# Patient Record
Sex: MALE | Race: WHITE | Employment: FULL TIME | URBAN - METROPOLITAN AREA
[De-identification: names, ages, dates, MRNs, and addresses within clinical notes are randomized per-mention and may not be internally consistent; named-entity substitution may affect disease eponyms.]

---

## 2020-03-24 ENCOUNTER — HOSPITAL ENCOUNTER (OUTPATIENT)
Age: 35
Discharge: HOME | End: 2020-03-24
Payer: COMMERCIAL

## 2020-03-24 VITALS
WEIGHT: 190 LBS | SYSTOLIC BLOOD PRESSURE: 131 MMHG | BODY MASS INDEX: 28.14 KG/M2 | OXYGEN SATURATION: 97 % | HEIGHT: 69 IN | RESPIRATION RATE: 18 BRPM | HEART RATE: 84 BPM | TEMPERATURE: 97 F | DIASTOLIC BLOOD PRESSURE: 75 MMHG

## 2020-03-24 DIAGNOSIS — L03.113 CELLULITIS OF RIGHT FOREARM: Primary | ICD-10-CM

## 2020-03-24 PROCEDURE — 99203 OFFICE O/P NEW LOW 30 MIN: CPT

## 2020-03-24 PROCEDURE — 99204 OFFICE O/P NEW MOD 45 MIN: CPT

## 2020-03-24 RX ORDER — AMOXICILLIN 250 MG/5ML
POWDER, FOR SUSPENSION ORAL 3 TIMES DAILY
COMMUNITY
End: 2020-03-24

## 2020-03-24 RX ORDER — CHLORHEXIDINE GLUCONATE 4 G/100ML
30 SOLUTION TOPICAL
Qty: 1 BOTTLE | Refills: 0 | Status: SHIPPED | OUTPATIENT
Start: 2020-03-24 | End: 2020-03-27

## 2020-03-24 RX ORDER — CLINDAMYCIN HYDROCHLORIDE 300 MG/1
300 CAPSULE ORAL 3 TIMES DAILY
Qty: 30 CAPSULE | Refills: 0 | Status: SHIPPED | OUTPATIENT
Start: 2020-03-24 | End: 2020-04-03

## 2020-03-24 RX ORDER — SULFAMETHOXAZOLE AND TRIMETHOPRIM 800; 160 MG/1; MG/1
1 TABLET ORAL 2 TIMES DAILY
Qty: 20 TABLET | Refills: 0 | Status: SHIPPED | OUTPATIENT
Start: 2020-03-24 | End: 2020-04-03

## 2020-03-24 NOTE — ED PROVIDER NOTES
Patient Seen in: [facility]      History   Patient presents with:  Cellulitis    Stated Complaint: inflamed cyst on arm    HPI    This is a 23-year-old male who comes in today with complaint of redness and pain to his right fore reactive to light and accommodation. Tympanic membranes normal.  Nasopharynx is clear oropharynx is clear without erythema or exudate or obstruction. No retropharyngeal or peritonsillar abscess noted. Neck:  Supple. No mass or adenopathy noted.   No men am going to give the patient a prescription for Bactroban as well and Hibiclens. Patient was instructed to return if the marked reddened area extends past the marker lines, he develops a fever, or has streaking up or down his arm.   I did mention to the pa needed.   Qty: 1 Bottle Refills: 0

## 2020-03-24 NOTE — ED INITIAL ASSESSMENT (HPI)
Pt c/o pain, redness and warmth to area surrounding a scabbed wound right forearm. Pt states what started out as a ingrown hair has progressed.

## 2025-03-05 ENCOUNTER — HOSPITAL ENCOUNTER (OUTPATIENT)
Age: 40
Discharge: HOME | End: 2025-03-05
Attending: EMERGENCY MEDICINE

## 2025-03-05 VITALS
SYSTOLIC BLOOD PRESSURE: 138 MMHG | BODY MASS INDEX: 26.66 KG/M2 | OXYGEN SATURATION: 98 % | HEART RATE: 119 BPM | HEIGHT: 69 IN | TEMPERATURE: 98 F | DIASTOLIC BLOOD PRESSURE: 82 MMHG | WEIGHT: 180 LBS | RESPIRATION RATE: 16 BRPM

## 2025-03-05 DIAGNOSIS — L03.115 BILATERAL LOWER LEG CELLULITIS: Primary | ICD-10-CM

## 2025-03-05 DIAGNOSIS — L03.116 BILATERAL LOWER LEG CELLULITIS: Primary | ICD-10-CM

## 2025-03-05 LAB
#MXD IC: 0.6 X10ˆ3/UL (ref 0.1–1)
BUN BLD-MCNC: 25 MG/DL (ref 7–18)
CHLORIDE BLD-SCNC: 103 MMOL/L (ref 98–112)
CO2 BLD-SCNC: 27 MMOL/L (ref 21–32)
CREAT BLD-MCNC: 1 MG/DL
EGFRCR SERPLBLD CKD-EPI 2021: 99 ML/MIN/1.73M2 (ref 60–?)
GLUCOSE BLD-MCNC: 128 MG/DL (ref 70–99)
HCT VFR BLD AUTO: 40.5 %
HCT VFR BLD CALC: 41 %
HGB BLD-MCNC: 13.5 G/DL
ISTAT IONIZED CALCIUM FOR CHEM 8: 1.17 MMOL/L (ref 1.12–1.32)
LYMPHOCYTES # BLD AUTO: 1.9 X10ˆ3/UL (ref 1–4)
LYMPHOCYTES NFR BLD AUTO: 26.9 %
MCH RBC QN AUTO: 29.1 PG (ref 26–34)
MCHC RBC AUTO-ENTMCNC: 33.3 G/DL (ref 31–37)
MCV RBC AUTO: 87.3 FL (ref 80–100)
MIXED CELL %: 9.4 %
NEUTROPHILS # BLD AUTO: 4.4 X10ˆ3/UL (ref 1.5–7.7)
NEUTROPHILS NFR BLD AUTO: 63.7 %
PLATELET # BLD AUTO: 201 X10ˆ3/UL (ref 150–450)
POTASSIUM BLD-SCNC: 4.2 MMOL/L (ref 3.6–5.1)
RBC # BLD AUTO: 4.64 X10ˆ6/UL
SODIUM BLD-SCNC: 140 MMOL/L (ref 136–145)
WBC # BLD AUTO: 6.9 X10ˆ3/UL (ref 4–11)

## 2025-03-05 PROCEDURE — 80047 BASIC METABLC PNL IONIZED CA: CPT

## 2025-03-05 PROCEDURE — 99204 OFFICE O/P NEW MOD 45 MIN: CPT

## 2025-03-05 PROCEDURE — 96374 THER/PROPH/DIAG INJ IV PUSH: CPT

## 2025-03-05 PROCEDURE — 85025 COMPLETE CBC W/AUTO DIFF WBC: CPT | Performed by: EMERGENCY MEDICINE

## 2025-03-05 RX ORDER — CEPHALEXIN 500 MG/1
500 CAPSULE ORAL 4 TIMES DAILY
Qty: 40 CAPSULE | Refills: 0 | Status: SHIPPED | OUTPATIENT
Start: 2025-03-05 | End: 2025-03-15

## 2025-03-05 NOTE — DISCHARGE INSTRUCTIONS
Contact your family doctor today to arrange reexamination and skin check in a few days    Elevate your legs as often as possible  Keep the skin clean and dry  Keflex antibiotic starts this evening    If your symptoms worsen or any new concerning symptoms develop, go to the hospital emergency department

## 2025-03-05 NOTE — ED PROVIDER NOTES
Patient Seen in: [facility]      History     Chief Complaint   Patient presents with    Rash Skin Problem     Stated Complaint: feet/ankle swelling    Subjective:   HPI      Patient tells me that about 2 weeks ago, he had some redness and swelling of the lower legs.  It seemed to get better but then took a turn for the worse last 2 days.  Patient felt achy without fever.  He did feel little chilled.  No redness elsewhere.  He denies any unusual activities or injuries.  No abrasions.  No punctures to the skin.    Objective:     History reviewed. No pertinent past medical history.           History reviewed. No pertinent surgical history.             Social History     Socioeconomic History    Marital status: Single   Tobacco Use    Smoking status: Every Day    Smokeless tobacco: Never   Vaping Use    Vaping status: Former    Start date: 6/24/2019    Quit date: 9/24/2019   Substance and Sexual Activity    Alcohol use: Yes    Drug use: Not Currently              Review of Systems    Positive for stated complaint: feet/ankle swelling  Other systems are as noted in HPI.  Constitutional and vital signs reviewed.      All other systems reviewed and negative except as noted above.    Physical Exam     ED Triage Vitals [03/05/25 1458]   /82   Pulse 119   Resp 16   Temp 98.1 °F (36.7 °C)   Temp src Oral   SpO2 98 %   O2 Device None (Room air)       Current Vitals:   Vital Signs  BP: 138/82  Pulse: 119  Resp: 16  Temp: 98.1 °F (36.7 °C)  Temp src: Oral    Oxygen Therapy  SpO2: 98 %  O2 Device: None (Room air)        Physical Exam  Legs: Both lower extremities are remarkable for scattered dime size hyperpigmented areas.  There is an area of fairly sharply demarcated blanching bright erythema noted extending about 5 finger breaths along the lower third of the right lower leg not involving the ankle joint.  No proximal lymphangitis.  The area is swollen, tender, and warm to touch.  On the left leg, there  is fainter erythema of about 2-3 finger breaths diameter again not involving the ankle joint.  More proximal calf is nonswollen nontender.  Dorsalis pedis pulse intact.    ED Course     Labs Reviewed   POCT ISTAT CHEM8 CARTRIDGE - Abnormal; Notable for the following components:       Result Value    ISTAT BUN 25 (*)     ISTAT Glucose 128 (*)     All other components within normal limits   POCT CBC                   MDM     Patient has cellulitis of his lower extremities.  Patient denies any injections/punctures to the lower extremities.  No abrasions or other injuries.  No evidence for DVT    I-STAT shows normal electrolytes and creatinine.  Glucose 128  Hemogram shows normal white count, hemoglobin, and platelets    I discussed with patient that I will treat with antibiotic but would have a low threshold to seek reevaluation in the hospital emergency department if symptoms are worsening or if any new symptoms develop.      I recommend:  Contact your family doctor today to arrange reexamination and skin check in a few days    Elevate your legs as often as possible  Keep the skin clean and dry  Keflex antibiotic    If your symptoms worsen or any new concerning symptoms develop, go to the hospital emergency department      Medical Decision Making      Disposition and Plan     Clinical Impression:  1. Bilateral lower leg cellulitis         Disposition:  Discharge  3/5/2025  4:18 pm    Follow-up:  Lizeth Rodriguez MD  [address] [address] [place] [id number]  [phone]    Call   As needed          Medications Prescribed:  Current Discharge Medication List        START taking these medications    Details   cephALEXin 500 MG Oral Cap Take 1 capsule (500 mg total) by mouth 4 (four) times daily for 10 days.  Qty: 40 capsule, Refills: 0                 Supplementary Documentation:

## 2025-03-10 ENCOUNTER — HOSPITAL ENCOUNTER (EMERGENCY)
Facility: HOSPITAL | Age: 40
Discharge: LEFT BEFORE DISCHARGE | End: 2025-03-10
Attending: EMERGENCY MEDICINE
Payer: COMMERCIAL

## 2025-03-10 ENCOUNTER — APPOINTMENT (OUTPATIENT)
Dept: ULTRASOUND IMAGING | Facility: HOSPITAL | Age: 40
End: 2025-03-10
Attending: EMERGENCY MEDICINE
Payer: COMMERCIAL

## 2025-03-10 ENCOUNTER — HOSPITAL ENCOUNTER (OUTPATIENT)
Age: 40
Discharge: ANOTHER HOSPITAL | End: 2025-03-10
Attending: EMERGENCY MEDICINE
Payer: COMMERCIAL

## 2025-03-10 ENCOUNTER — APPOINTMENT (OUTPATIENT)
Dept: CT IMAGING | Facility: HOSPITAL | Age: 40
End: 2025-03-10
Attending: EMERGENCY MEDICINE
Payer: COMMERCIAL

## 2025-03-10 VITALS
TEMPERATURE: 98 F | HEART RATE: 114 BPM | DIASTOLIC BLOOD PRESSURE: 82 MMHG | OXYGEN SATURATION: 99 % | RESPIRATION RATE: 16 BRPM | WEIGHT: 180 LBS | SYSTOLIC BLOOD PRESSURE: 164 MMHG | BODY MASS INDEX: 26.66 KG/M2 | HEIGHT: 69 IN

## 2025-03-10 VITALS
OXYGEN SATURATION: 99 % | DIASTOLIC BLOOD PRESSURE: 72 MMHG | WEIGHT: 180 LBS | HEIGHT: 69 IN | RESPIRATION RATE: 18 BRPM | TEMPERATURE: 97 F | HEART RATE: 115 BPM | BODY MASS INDEX: 26.66 KG/M2 | SYSTOLIC BLOOD PRESSURE: 148 MMHG

## 2025-03-10 DIAGNOSIS — L03.119 CELLULITIS OF LOWER EXTREMITY, UNSPECIFIED LATERALITY: Primary | ICD-10-CM

## 2025-03-10 DIAGNOSIS — L03.119 CELLULITIS OF LOWER EXTREMITY, UNSPECIFIED LATERALITY: ICD-10-CM

## 2025-03-10 DIAGNOSIS — R60.0 LOWER EXTREMITY EDEMA: Primary | ICD-10-CM

## 2025-03-10 DIAGNOSIS — R06.02 SHORTNESS OF BREATH: ICD-10-CM

## 2025-03-10 DIAGNOSIS — J98.01 ACUTE BRONCHOSPASM: ICD-10-CM

## 2025-03-10 LAB
ALBUMIN SERPL-MCNC: 4.5 G/DL (ref 3.2–4.8)
ALBUMIN/GLOB SERPL: 1.6 {RATIO} (ref 1–2)
ALP LIVER SERPL-CCNC: 147 U/L
ALT SERPL-CCNC: 28 U/L
ANION GAP SERPL CALC-SCNC: 3 MMOL/L (ref 0–18)
AST SERPL-CCNC: 21 U/L (ref ?–34)
BASOPHILS # BLD AUTO: 0.01 X10(3) UL (ref 0–0.2)
BASOPHILS NFR BLD AUTO: 0.2 %
BILIRUB SERPL-MCNC: 0.4 MG/DL (ref 0.3–1.2)
BUN BLD-MCNC: 17 MG/DL (ref 9–23)
CALCIUM BLD-MCNC: 9.5 MG/DL (ref 8.7–10.6)
CHLORIDE SERPL-SCNC: 104 MMOL/L (ref 98–112)
CO2 SERPL-SCNC: 33 MMOL/L (ref 21–32)
CREAT BLD-MCNC: 0.98 MG/DL
EGFRCR SERPLBLD CKD-EPI 2021: 101 ML/MIN/1.73M2 (ref 60–?)
EOSINOPHIL # BLD AUTO: 0.23 X10(3) UL (ref 0–0.7)
EOSINOPHIL NFR BLD AUTO: 4.3 %
ERYTHROCYTE [DISTWIDTH] IN BLOOD BY AUTOMATED COUNT: 12 %
GLOBULIN PLAS-MCNC: 2.9 G/DL (ref 2–3.5)
GLUCOSE BLD-MCNC: 103 MG/DL (ref 70–99)
HCT VFR BLD AUTO: 39.9 %
HGB BLD-MCNC: 13.5 G/DL
IMM GRANULOCYTES # BLD AUTO: 0.01 X10(3) UL (ref 0–1)
IMM GRANULOCYTES NFR BLD: 0.2 %
LACTATE SERPL-SCNC: 1.2 MMOL/L (ref 0.5–2)
LYMPHOCYTES # BLD AUTO: 1.49 X10(3) UL (ref 1–4)
LYMPHOCYTES NFR BLD AUTO: 27.5 %
MCH RBC QN AUTO: 29.1 PG (ref 26–34)
MCHC RBC AUTO-ENTMCNC: 33.8 G/DL (ref 31–37)
MCV RBC AUTO: 86 FL
MONOCYTES # BLD AUTO: 0.68 X10(3) UL (ref 0.1–1)
MONOCYTES NFR BLD AUTO: 12.6 %
NEUTROPHILS # BLD AUTO: 2.99 X10 (3) UL (ref 1.5–7.7)
NEUTROPHILS # BLD AUTO: 2.99 X10(3) UL (ref 1.5–7.7)
NEUTROPHILS NFR BLD AUTO: 55.2 %
NT-PROBNP SERPL-MCNC: 58 PG/ML (ref ?–125)
OSMOLALITY SERPL CALC.SUM OF ELEC: 292 MOSM/KG (ref 275–295)
PLATELET # BLD AUTO: 200 10(3)UL (ref 150–450)
POTASSIUM SERPL-SCNC: 4.2 MMOL/L (ref 3.5–5.1)
PROT SERPL-MCNC: 7.4 G/DL (ref 5.7–8.2)
RBC # BLD AUTO: 4.64 X10(6)UL
SODIUM SERPL-SCNC: 140 MMOL/L (ref 136–145)
TROPONIN I SERPL HS-MCNC: 4 NG/L
WBC # BLD AUTO: 5.4 X10(3) UL (ref 4–11)

## 2025-03-10 PROCEDURE — 87040 BLOOD CULTURE FOR BACTERIA: CPT | Performed by: EMERGENCY MEDICINE

## 2025-03-10 PROCEDURE — 93970 EXTREMITY STUDY: CPT | Performed by: EMERGENCY MEDICINE

## 2025-03-10 PROCEDURE — 93005 ELECTROCARDIOGRAM TRACING: CPT

## 2025-03-10 PROCEDURE — 80053 COMPREHEN METABOLIC PANEL: CPT | Performed by: EMERGENCY MEDICINE

## 2025-03-10 PROCEDURE — 99284 EMERGENCY DEPT VISIT MOD MDM: CPT

## 2025-03-10 PROCEDURE — 99212 OFFICE O/P EST SF 10 MIN: CPT

## 2025-03-10 PROCEDURE — 84484 ASSAY OF TROPONIN QUANT: CPT | Performed by: EMERGENCY MEDICINE

## 2025-03-10 PROCEDURE — 83605 ASSAY OF LACTIC ACID: CPT | Performed by: EMERGENCY MEDICINE

## 2025-03-10 PROCEDURE — 85025 COMPLETE CBC W/AUTO DIFF WBC: CPT | Performed by: EMERGENCY MEDICINE

## 2025-03-10 PROCEDURE — 99285 EMERGENCY DEPT VISIT HI MDM: CPT

## 2025-03-10 PROCEDURE — 93010 ELECTROCARDIOGRAM REPORT: CPT

## 2025-03-10 PROCEDURE — 83880 ASSAY OF NATRIURETIC PEPTIDE: CPT | Performed by: EMERGENCY MEDICINE

## 2025-03-10 PROCEDURE — 96365 THER/PROPH/DIAG IV INF INIT: CPT

## 2025-03-10 RX ORDER — SULFAMETHOXAZOLE AND TRIMETHOPRIM 800; 160 MG/1; MG/1
1 TABLET ORAL 2 TIMES DAILY
Qty: 20 TABLET | Refills: 0 | Status: SHIPPED | OUTPATIENT
Start: 2025-03-10 | End: 2025-03-20

## 2025-03-10 RX ORDER — ALBUTEROL SULFATE 90 UG/1
2 INHALANT RESPIRATORY (INHALATION) EVERY 4 HOURS PRN
Qty: 1 EACH | Refills: 0 | Status: SHIPPED | OUTPATIENT
Start: 2025-03-10 | End: 2025-04-09

## 2025-03-10 RX ORDER — ALBUTEROL SULFATE 90 UG/1
4 INHALANT RESPIRATORY (INHALATION) ONCE
Status: COMPLETED | OUTPATIENT
Start: 2025-03-10 | End: 2025-03-10

## 2025-03-10 NOTE — ED INITIAL ASSESSMENT (HPI)
Pt arrives to ED for evaluation of swelling to his bilateral lower extremities. Pt states the swelling has been there for about 3 weeks, pt denies being exposed to anything. Pt seen at [facility] last week, pt received IV meds and was discharged with home meds with no relief. Pt bilateral lower extremities are red, warm, with stiff ankle joints.    2

## 2025-03-10 NOTE — ED PROVIDER NOTES
Patient Seen in: [facility]      History   No chief complaint on file.    Stated Complaint: cellulitis on both legs    Subjective:   HPI      40 yo male no signal past medical he presents ED with complaints of lower extremity edema and erythema.  Reports that he was placed on Keflex on 3/5/2025 when he was seen at [facility] was diagnosed with lower extremity cellulitis.  Reports that the swelling has increased he is noticing more veins in his legs and the redness is now going up the leg above the knee.  Denies any fevers or chills.  Reports that he feels short of breath and occasionally lightheaded.  Denies any active chest pain here.    Objective:     No pertinent past medical history.            No pertinent past surgical history.              No pertinent social history.            Review of Systems    Positive for stated complaint: cellulitis on both legs  Other systems are as noted in HPI.  Constitutional and vital signs reviewed.      All other systems reviewed and negative except as noted above.    Physical Exam     ED Triage Vitals [03/10/25 1235]   /72   Pulse 115   Resp 18   Temp 97.4 °F (36.3 °C)   Temp src Oral   SpO2 99 %   O2 Device None (Room air)       Current Vitals:   Vital Signs  BP: 148/72  Pulse: 115  Resp: 18  Temp: 97.4 °F (36.3 °C)  Temp src: Oral    Oxygen Therapy  SpO2: 99 %  O2 Device: None (Room air)        Physical Exam  Vitals and nursing note reviewed.   Constitutional:       Appearance: He is well-developed.   HENT:      Head: Normocephalic and atraumatic.   Eyes:      Pupils: Pupils are equal, round, and reactive to light.   Cardiovascular:      Rate and Rhythm: Regular rhythm. Tachycardia present.   Pulmonary:      Effort: Pulmonary effort is normal.      Breath sounds: Normal breath sounds.   Abdominal:      General: Bowel sounds are normal.      Palpations: Abdomen is soft.   Musculoskeletal:         General: No deformity.      Cervical  back: Normal range of motion and neck supple.   Skin:     General: Skin is warm and dry.      Capillary Refill: Capillary refill takes less than 2 seconds.      Comments: Significant areas of erythema and warmth in bilateral lower extremities, +1-2 pitting edema bilateral lower extremities, + varicose veins   Neurological:      Mental Status: He is alert and oriented to person, place, and time.             ED Course   Labs Reviewed - No data to display                    MDM     This is a 39-year-old male who  presents to the immediate care with his wife with complaints of lower extremity swelling worsening redness while on antibiotics and shortness of breath and dizziness.  Patient is mildly hypertensive on arrival and tachycardic.  Oxygen saturation are 99% on room air lungs are CTA patient appears to be in no respiratory distress.  Lower extremity with significant edema and erythema bilateral lower extremities.  Concern for antibiotic failure versus bilateral DVT.  Given new symptoms of shortness of breath as well as this tachycardia will send to ED for further evaluation for venous thrombosis and worsening cellulitis.  Patient will go directly to [facility] ER.        Medical Decision Making      Disposition and Plan     Clinical Impression:  1. Lower extremity edema    2. Shortness of breath    3. Cellulitis of lower extremity, unspecified laterality         Disposition:  Ic to ed  3/10/2025  1:17 pm    Follow-up:  No follow-up provider specified.        Medications Prescribed:  Current Discharge Medication List              Supplementary Documentation:

## 2025-03-10 NOTE — ED PROVIDER NOTES
Patient Seen in: [facility] Emergency Department      History     Chief Complaint   Patient presents with    Swelling Edema    Difficulty Breathing    Chest Pain Angina    Cellulitis     Stated Complaint: Bilat lower leg swelling x 3 weeks    Subjective:   HPI      This is a 39-year-old male who arrives with bilateral lower extremity swelling for 3 weeks.  About 3 weeks ago the patient started swelling of the legs.  The patient states that on 3/5/2025 the patient was seen for this rash possible cellulitis darted on antibiotics.  He is at about 4 to 5 days of antibiotics.  He says he was given a first dose and IV dose of antibiotics and then sent home.  At that time he really felt bad he had chills did not feel well.  He stated that he was more in pain.  Those symptoms are pretty much gotten resolved but he has noticed that the swelling has not gotten any better.  He is bilateral lower extremities are red warm with stiff general ankle joints.  The patient states that he also had some mild shortness of breath.  The patient was seen today in March 10 and was sent here for further evaluation.  The patient denies any fevers or chills at this present time he does have some intermittent wheezing.  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  He does not doing that it recently and has not had any recent injections..  The patient denied any fevers or chills recently.  Denies any history of blood clots.  He does smoke.    Objective:     History reviewed. No pertinent past medical history.           History reviewed. No pertinent surgical history.             Social History     Socioeconomic History    Marital status: Single   Tobacco Use    Smoking status: Every Day    Smokeless tobacco: Never   Vaping Use    Vaping status: Former    Start date: 6/24/2019    Quit date: 9/24/2019   Substance and Sexual Activity    Alcohol use: Yes     Comment: daily    Drug use: Not  Currently                  Physical Exam     ED Triage Vitals [03/10/25 1545]   BP (!) 164/82   Pulse 114   Resp 16   Temp 97.9 °F (36.6 °C)   Temp src Temporal   SpO2 99 %   O2 Device        Current Vitals:   Vital Signs  BP: (!) 164/82  Pulse: 114  Resp: 16  Temp: 97.9 °F (36.6 °C)  Temp src: Temporal    Oxygen Therapy  SpO2: 99 %        Physical Exam     General: .  Patient is in no respiratory distress.  At this present time.  Denies any chest pain.  The patient is in no respiratory distress    HEENT: Atraumatic, conjunctiva are not pale.  There is no icterus.  Oral mucosa Is wet.  No facial trauma.  The neck is supple.    LUNGS: Mild end expiratory wheezing.    CV: Cardiovascular is regular without murmurs or rubs.    ABD: The abdomen is soft nondistended nontender.  There is no rebound.  There is no guarding.    EXT: There is good pulses bilaterally.    There is rash towards both legs.  Mild more mild to moderate redness..  There is good pulses mild calf tenderness bilaterally.  Scars from his previous surgery scars from his previous injections.  No obvious discernible abscess.    NEURO: Alert and oriented x4.  Muscle strength and sensory exam is grossly normal.  And the patient is neurologically intact with no focal findings.    ED Course     Labs Reviewed   COMP METABOLIC PANEL (14) - Abnormal; Notable for the following components:       Result Value    Glucose 103 (*)     CO2 33.0 (*)     Alkaline Phosphatase 147 (*)     All other components within normal limits   LACTIC ACID, PLASMA - Normal   TROPONIN I HIGH SENSITIVITY - Normal   PRO BETA NATRIURETIC PEPTIDE - Normal   CBC WITH DIFFERENTIAL WITH PLATELET   BLOOD CULTURE   BLOOD CULTURE               The patient was placed on monitors, IV was started, blood was drawn.  Workup was done to rule out an acute electrolyte imbalance.       MDM      The EKG shows sinus tachycardia.  There is no acute ST elevations nonspecific ST changes.  QRS duration is 88.   The rest of the EKG including rate rhythm axis and intervals I agree with the EKG report . The rate is 105      Workup was done to rule out sepsis pulmonary embolism, DVT was considered.  He is wheezing.  Will give him albuterol treatment.    The patient's comprehensive shows no significant abnormalities troponin was negative lactic acid was not elevated CBC was grossly negative.  Blood cultures were obtained.    I personally reviewed the radiographs and my individual interpretation shows  No obvious DVT      Also reviewed official report and it shows    US VENOUS DOPPLER LEG BILAT - DIAG IMG (CPT=93970)    Result Date: 3/10/2025  PROCEDURE:  US VENOUS DOPPLER LEG BILAT - DIAG IMG (CPT=93970)  COMPARISON:  None.  INDICATIONS:  Bilat lower leg swelling x 3 weeks  TECHNIQUE:  Real time, grey scale, and duplex ultrasound was used to evaluate the lower extremity venous system. B-mode two-dimensional images of the vascular structures, Doppler spectral analysis, and color flow.  Doppler imaging were performed.  The following veins were imaged bilaterally:  Common, deep, and superficial femoral, popliteal, sapheno-femoral junction, and posterior tibial veins.  PATIENT STATED HISTORY: (As transcribed by Technologist)  Patient reports bilateral leg swelling x 3 weeks, was diagnosed with cellulitis but not getting better.    FINDINGS:  SAPHENOFEMORAL JUNCTION:  No reflux. THROMBI:  None visible. COMPRESSION:  Normal compressibility, phasicity, and augmentation. OTHER:  Prominent bilateral inguinal lymph nodes with the largest lymph node in the right groin measuring up to 4.2 x 1.0 x 4.6 cm.            CONCLUSION:  No evidence of DVT in the lower extremities.   LOCATION:  [facility]   Dictated by (CST): Sohail Cazares MD on 3/10/2025 at 8:44 PM     Finalized by (CST): Sohail Cazares MD on 3/10/2025 at 8:46 PM          The patient became extremely anxious while he was here.  He was with his family members.  I had that he is scheduled  to go to CT and he was ready to go there and he refused to go there to CT I discussed I cannot tell about a pulmonary embolism acute pathology in the lungs without doing a CT.  Understands that there is a risk of going home including worst-case death or disability.  I did go back and listen to him he was not wheezing at this present time he states he will use inhaler and he did want a different type of antibiotics I discussed this case with pharmacy who recommended Bactrim.  He had already gotten 2 g of ceftriaxone.  He will sign out AGAINST MEDICAL ADVICE he understands there is a risk of going home including worst-case death or disability.  His proBNP was not elevated he did sign out AGAINST MEDICAL ADVICE but I discussed him at any point he can always return to the emergency room discussed the things to return here including increasing redness swelling fevers or more shortness of breath.  He was taught how to use a spacer by our staff.    Medical Decision Making      Disposition and Plan     Clinical Impression:  1. Cellulitis of lower extremity, unspecified laterality    2. Acute bronchospasm         Disposition:  [place]  3/10/2025  7:55 pm    Follow-up:  Lavonne Frederick MD  [address]  [facility] [address]  [facility] [address]  [phone]    Follow up in 2 day(s)            Medications Prescribed:  Discharge Medication List as of 3/10/2025  7:56 PM        START taking these medications    Details   sulfamethoxazole-trimethoprim -160 MG Oral Tab per tablet Take 1 tablet by mouth 2 (two) times daily for 10 days., Normal, Disp-20 tablet, R-0      albuterol 108 (90 Base) MCG/ACT Inhalation Aero Soln Inhale 2 puffs into the lungs every 4 (four) hours as needed for Wheezing., Normal, Disp-1 each, R-0                 Supplementary Documentation:

## 2025-03-11 NOTE — DISCHARGE INSTRUCTIONS
Follow-up with your primary care physician..  Use inhaler every 4-6 hours return if increasing redness swelling or fevers..  Return if any chest pain or shortness of breath    Use inhaler every 4-6 hours follow-up with your doctor return fevers increasing swelling pain discomfort fevers

## 2025-03-11 NOTE — ED QUICK NOTES
Pt notified this RN that he wanted to leave AMA. MD notified. Dr. Dia spoke with patient regarding leaving AMA, patient states he is ok with signing out AMA.

## 2025-03-12 LAB
ATRIAL RATE: 105 BPM
P AXIS: 68 DEGREES
P-R INTERVAL: 140 MS
Q-T INTERVAL: 328 MS
QRS DURATION: 88 MS
QTC CALCULATION (BEZET): 433 MS
R AXIS: 67 DEGREES
T AXIS: 42 DEGREES
VENTRICULAR RATE: 105 BPM

## (undated) NOTE — LETTER
Date & Time: 3/10/2025, 7:53 PM  Patient: Joon Sapp  Encounter Provider(s):    Mateusz Dia MD         This certifies that I, Joon Sapp, a patient at an [facility], am leaving the facility voluntarily and against the advice of my physician.    I acknowledge that I have been:    1. informed that my physician believes that I need to receive care here;  2. informed that if I leave, I could become sicker or even die; and  3. provided discharge instructions consistent with my current diagnosis.    I hereby release my physician, the facility, and its employees from all responsibility for any ill effects which may result from this action.        __________________________________  Patient or authorized caregiver signature    __________________________________  RN signature    If no patient or patient representative signature was obtained, sign below to acknowledge that the form was reviewed with the patient and that the patient refused to sign.    __________________________________  RN signature